# Patient Record
Sex: MALE | Race: WHITE | Employment: OTHER | URBAN - METROPOLITAN AREA
[De-identification: names, ages, dates, MRNs, and addresses within clinical notes are randomized per-mention and may not be internally consistent; named-entity substitution may affect disease eponyms.]

---

## 2022-12-17 ENCOUNTER — APPOINTMENT (OUTPATIENT)
Dept: GENERAL RADIOLOGY | Age: 32
End: 2022-12-17

## 2022-12-17 ENCOUNTER — HOSPITAL ENCOUNTER (EMERGENCY)
Age: 32
Discharge: HOME | End: 2022-12-17

## 2022-12-17 VITALS
DIASTOLIC BLOOD PRESSURE: 88 MMHG | OXYGEN SATURATION: 99 % | HEART RATE: 81 BPM | SYSTOLIC BLOOD PRESSURE: 135 MMHG | TEMPERATURE: 98.2 F | RESPIRATION RATE: 16 BRPM

## 2022-12-17 DIAGNOSIS — S60.221A CONTUSION OF RIGHT HAND, INITIAL ENCOUNTER: Primary | ICD-10-CM

## 2022-12-17 PROCEDURE — 73110 X-RAY EXAM OF WRIST: CPT

## 2022-12-17 PROCEDURE — 99283 EMERGENCY DEPT VISIT LOW MDM: CPT | Performed by: NURSE PRACTITIONER

## 2022-12-17 PROCEDURE — 99283 EMERGENCY DEPT VISIT LOW MDM: CPT

## 2022-12-17 PROCEDURE — 73130 X-RAY EXAM OF HAND: CPT

## 2022-12-17 RX ORDER — IBUPROFEN 600 MG/1
600 TABLET ORAL 3 TIMES DAILY PRN
Qty: 15 TABLET | Refills: 0 | Status: SHIPPED | OUTPATIENT
Start: 2022-12-17 | End: 2022-12-22

## 2022-12-17 ASSESSMENT — PAIN SCALES - GENERAL: PAINLEVEL_OUTOF10: 10

## 2022-12-17 ASSESSMENT — PAIN DESCRIPTION - LOCATION: LOCATION: HAND

## 2022-12-17 ASSESSMENT — PAIN DESCRIPTION - ORIENTATION: ORIENTATION: RIGHT

## 2022-12-17 NOTE — ED PROVIDER NOTES
[address] ENCOUNTER        Pt Name: Elena Fry  MRN: [id number]  [place] 1990  Date of evaluation: 12/17/2022  Provider: ULI Frances CNP  PCP: No primary care provider on file. DEBORAH. I have evaluated this patient. My supervising physician was available for consultation. Triage CHIEF COMPLAINT       Chief Complaint   Patient presents with    Hand Injury     Rt hand injury after punching something last night         HISTORY OF PRESENT ILLNESS      Chief Complaint: right hand pain    Elena Fry is a 32 y.o. male who presents for right hand pain since last night that started after he was sparring with another person and they punched fists together by mistake. Reports some numbness and paresthesias. No other injuries. No cuts or open areas on knuckles. Was wearing thin gloves at time./      Nursing Notes were all reviewed and agreed with or any disagreements were addressed in the HPI. REVIEW OF SYSTEMS     Musculoskeletal:  See HPI  Skin:   Denies rash  Neurologic:   See HPI    PAST MEDICAL HISTORY   History reviewed. No pertinent past medical history. SURGICAL HISTORY   History reviewed. No pertinent surgical history. CURRENTMEDICATIONS       Previous Medications    No medications on file       ALLERGIES     Patient has no known allergies. FAMILYHISTORY     History reviewed. No pertinent family history.      SOCIAL HISTORY       Social History     Tobacco Use    Smoking status: Every Day     Packs/day: 0.50     Types: Cigarettes    Smokeless tobacco: Never   Substance and Sexual Activity    Alcohol use: Yes     Comment: socially    Drug use: Not Currently       SCREENINGS           PHYSICAL EXAM       ED Triage Vitals [12/17/22 1344]   BP Temp Temp src Heart Rate Resp SpO2 Height Weight   (!) 159/93 98.2 °F (36.8 °C) -- 88 18 100 % -- --      Constitutional:  Well developed, Well nourished. No distress  Cardiovascular:   RRR,  no murmurs/rubs/gallops. Normal radial pulse on right. Respiratory:   Nonlabored breathing. Normal breath sounds, No wheezing  Musculoskeletal:  Right hand is tender over 2nd MCP as well as the base of the 4-5th metacarpals. There is moderate edema over the 2-5th MCP but normal ROM of fingers without significant restriction or discomfort. Wrist is nontender with normal ROM. Integument:   Warm, Dry, No rashes. No obvious ecchymosis. Neurologic:  Alert & oriented , No focal deficits noted. Sensation intact. Psychiatric:  Affect normal, Mood normal.     DIAGNOSTIC RESULTS   LABS:    Labs Reviewed - No data to display    When ordered, only abnormal lab results are displayed. All other labs were within normal range or not returned as of this dictation. EKG: When ordered, EKG's are interpreted by the Emergency Department Physician in the absence of a cardiologist.  Please see their note for interpretation of EKG. RADIOLOGY:   Non-plain film images such as CT, Ultrasound and MRI are read by the radiologist. Plain radiographic images are visualized and preliminarily interpreted by the  ED Provider with the below findings:    Interpretation perthe Radiologist below, if available at the time of this note:    XR WRIST RIGHT (MIN 3 VIEWS)   Final Result   Unremarkable right wrist examination. XR HAND RIGHT (MIN 3 VIEWS)   Final Result   No acute osseous abnormality. Patient was never admitted. PROCEDURES   Unless otherwise noted below, none         CRITICAL CARE   CRITICAL CARE NOTE:   N/A    CONSULTS:  None      EMERGENCY DEPARTMENT COURSE and MDM:   Vitals:    Vitals:    12/17/22 1344   BP: (!) 159/93   Pulse: 88   Resp: 18   Temp: 98.2 °F (36.8 °C)   SpO2: 100%       Patient was given thefollowing medications:  Medications - No data to display      [unfilled]    MDM:  Patient presents as above. Emergent etiologies considered. Patient seen and examined. Work-up initiated secondary to presentation, physical exam findings, vital signs and medical chart review. Olinda Wolf CNP, am the primary clinician of record. In brief, presents for evaluation of right hand pain after injuring his hand while sparring last night. An x-ray of the right hand and right wrist were negative for any acute osseous abnormality. Patient does have some mild swelling over the right hand and likely has sustained contusion. Discussed RICE protocol. Will place in a cock up splint as the patient feels this will be helpful. Advised to take ibuprofen or Tylenol as needed. Patient declined need for work note. CLINICAL IMPRESSION      1.  Contusion of right hand, initial encounter          DISPOSITION/PLAN   DISPOSITION Decision To Discharge 12/17/2022 03:25:24 PM      PATIENT REFERREDTO:  Ricardo Gibbons,   [facility] [address]  [phone]      If symptoms worsen    DISCHARGE MEDICATIONS:  New Prescriptions    IBUPROFEN (ADVIL;MOTRIN) 600 MG TABLET    Take 1 tablet by mouth 3 times daily as needed for Pain       DISCONTINUED MEDICATIONS:  Discontinued Medications    No medications on file              (Please note that portions ofthis note were completed with a voice recognition program.  Efforts were made to edit the dictations but occasionally words are mis-transcribed.)    ULI Juares CNP (electronically signed)             ULI Banerjee CNP  12/17/22 [id number]